# Patient Record
Sex: MALE | Race: WHITE
[De-identification: names, ages, dates, MRNs, and addresses within clinical notes are randomized per-mention and may not be internally consistent; named-entity substitution may affect disease eponyms.]

---

## 2017-12-07 ENCOUNTER — HOSPITAL ENCOUNTER (OUTPATIENT)
Dept: HOSPITAL 31 - C.ENDO | Age: 47
Discharge: HOME | End: 2017-12-07
Attending: INTERNAL MEDICINE
Payer: COMMERCIAL

## 2017-12-07 VITALS — SYSTOLIC BLOOD PRESSURE: 117 MMHG | DIASTOLIC BLOOD PRESSURE: 70 MMHG | RESPIRATION RATE: 12 BRPM | HEART RATE: 88 BPM

## 2017-12-07 VITALS — BODY MASS INDEX: 26.5 KG/M2

## 2017-12-07 VITALS — TEMPERATURE: 97.7 F

## 2017-12-07 VITALS — OXYGEN SATURATION: 99 %

## 2017-12-07 DIAGNOSIS — K21.0: Primary | ICD-10-CM

## 2017-12-07 DIAGNOSIS — K29.70: ICD-10-CM

## 2017-12-07 PROCEDURE — 43239 EGD BIOPSY SINGLE/MULTIPLE: CPT

## 2017-12-07 PROCEDURE — 88312 SPECIAL STAINS GROUP 1: CPT

## 2017-12-07 PROCEDURE — 88342 IMHCHEM/IMCYTCHM 1ST ANTB: CPT

## 2017-12-07 PROCEDURE — 88305 TISSUE EXAM BY PATHOLOGIST: CPT

## 2017-12-07 NOTE — CP.SDSHP
[facility]





- History


Proposed Procedure: endoscopy


Pre-Op Diagnosis: reflux, esophagitis





- Previous Medical/Surgical History


Comments: varicose veins





- Allergies


Allergies: 


Allergies





No Known Allergies Allergy (Verified 12/07/17 08:10)


 











- Physical Exam


Vital Signs: 


 Vital Signs











  12/07/17





  07:49


 


Temperature 97.6 F


 


Pulse Rate 66


 


Respiratory 19





Rate 


 


Blood Pressure 130/76


 


O2 Sat by Pulse 100





Oximetry 











Mental Status: Alert & Oriented x3


Neuro: WNL


Heart: WNL


Lungs: WNL


GI: WNL





- {Optional Preform as Required}


Abdomen: WNL





- Impression


Impression: reflux, esophagitis


Pt. Evaluated Today:Candidate for Anesthesia & Procedure: Yes





- Date & Time


Date: 12/07/17


Time: 09:05





Short Stay Discharge





- Short Stay Discharge


Admitting Diagnosis/Reason for Visit: GASTRO-ESOPHAGEAL REFLUX DISEASE WITHOUT 

ESOPHAGIT


Disposition: HOME/ ROUTINE

## 2018-01-05 ENCOUNTER — HOSPITAL ENCOUNTER (EMERGENCY)
Dept: HOSPITAL 42 - ED | Age: 48
Discharge: HOME | End: 2018-01-05
Payer: COMMERCIAL

## 2018-01-05 VITALS
TEMPERATURE: 98.5 F | OXYGEN SATURATION: 99 % | RESPIRATION RATE: 16 BRPM | DIASTOLIC BLOOD PRESSURE: 80 MMHG | HEART RATE: 68 BPM | SYSTOLIC BLOOD PRESSURE: 130 MMHG

## 2018-01-05 VITALS — BODY MASS INDEX: 34 KG/M2

## 2018-01-05 DIAGNOSIS — N39.0: Primary | ICD-10-CM

## 2018-01-05 LAB
APPEARANCE UR: (no result)
BACTERIA #/AREA URNS HPF: (no result) /[HPF]
BILIRUB UR-MCNC: NEGATIVE MG/DL
COLOR UR: YELLOW
EPI CELLS #/AREA URNS HPF: (no result) /HPF (ref 0–5)
GLUCOSE UR STRIP-MCNC: NEGATIVE MG/DL
LEUKOCYTE ESTERASE UR-ACNC: (no result) LEU/UL
PH UR STRIP: 6 [PH] (ref 4.7–8)
PROT UR STRIP-MCNC: (no result) MG/DL
RBC # UR STRIP: (no result) /UL
SP GR UR STRIP: >= 1.03 (ref 1–1.03)
URINE NITRATE: NEGATIVE
UROBILINOGEN UR STRIP-ACNC: 0.2 E.U./DL

## 2018-01-05 NOTE — ED PDOC
Arrival/HPI





- General


Chief Complaint: Male Genitourinary


Time Seen by Provider: 01/05/18 19:37


Historian: Patient





- History of Present Illness


Narrative History of Present Illness (Text): 





01/05/18 20:53


47-year-old male presents today with dysuria that started today. Patient states 

on [place] she had an episode of dysuria but it resolved. Patient denies 

urinary frequency. Denies fevers or chills. Denies chest pain or shortness of 

breath. Patient denies dizziness or weakness. Denies abdominal pain. No nausea 

or vomiting. Patient denies penile discharge. The patient states today he 

started to get burning again at the end of his stream. He denies hematuria. No 

other complaints


Quality: Burning


Severity Level: 1





Past Medical History





- Provider Review


Nursing Documentation Reviewed: Yes





- Travel History


Have you recently traveled outside [place] w/in the past 3 mons?: No





- Infectious Disease


Hx of Infectious Diseases: None





- Cardiac


Hx Cardiac Disorders: Yes


Hx Angina: No


Hx Cardiac Arrhythmia: No


Hx Circulatory Problems: Yes (varicous veins)


Hx Congestive Heart Failure: No


Hx MI: No


Hx Heart Murmur: No


Hx Heart Transplant: No


Hx Hypertension: No


Hx Hypotension: No


Hx Internal Defibrillator: No


Hx Mitral Valve Prolapse: No


Hx Pacemaker: No


Hx Peripheral Edema: No


Hx Peripheral Vascular Disease: No





- Pulmonary


Hx Respiratory Disorders: No





- Neurological


Hx Neurological Disorder: No





- HEENT


Hx HEENT Disorder: No





- Renal


Hx Renal Disorder: No





- Endocrine/Metabolic


Hx Endocrine Disorders: No





- Hematological/Oncological


Hx Blood Disorders: No





- Integumentary


Hx Dermatological Disorder: No





- Musculoskeletal/Rheumatological


Hx Musculoskeletal Disorders: No





- Gastrointestinal


Hx Gastrointestinal Disorders: Yes


Hx Gastroesophageal Reflux: Yes





- Genitourinary/Gynecological


Hx Genitourinary Disorders: No





- Psychiatric


Hx Psychophysiologic Disorder: No


Hx Substance Use: No





- Surgical History


Other/Comment: HAIR AND DENTAL IMPLANTS





- Anesthesia


Hx Anesthesia: Yes (Local for hair implant/DENTAL )


Hx Anesthesia Reactions: No


Hx Malignant Hyperthermia: No





Family/Social History





- Physician Review


Nursing Documentation Reviewed: Yes


Family/Social History: Unknown Family HX


Smoking Status: Never Smoked


Hx Alcohol Use: No


Hx Substance Use: No





Allergies/Home Meds


Allergies/Adverse Reactions: 


Allergies





No Known Allergies Allergy (Verified 12/07/17 08:10)


 








Home Medications: 


 Home Meds











 Medication  Instructions  Recorded  Confirmed


 


Aspirin 81 mg PO DAILY 12/07/17 01/05/18














Review of Systems





- Review of Systems


Constitutional: absent: Fatigue, Fevers


Respiratory: absent: SOB, Cough


Cardiovascular: absent: Chest Pain, Palpitations


Gastrointestinal: absent: Abdominal Pain, Nausea, Vomiting


Genitourinary Male: Dysuria, Other (no testicular pain. no penile discharge).  

absent: Frequency, Hematuria, Urinary Output Changes


Musculoskeletal: absent: Arthralgias, Back Pain, Neck Pain


Skin: absent: Rash, Pruritis


Neurological: absent: Headache, Dizziness


Psychiatric: absent: Anxiety, Depression





Physical Exam


Vital Signs Reviewed: Yes


Vital Signs











  Temp Pulse Resp BP Pulse Ox


 


 01/05/18 19:25  98.5 F  68  16  130/80  99











Temperature: Afebrile


Blood Pressure: Normal


Pulse: Regular


Respiratory Rate: Normal


Appearance: Positive for: Well-Appearing, Non-Toxic, Comfortable


Pain Distress: None


Mental Status: Positive for: Alert and Oriented X 3





- Systems Exam


Head: Present: Atraumatic


Mouth: Present: Moist Mucous Membranes


Neck: Present: Normal Range of Motion


Respiratory/Chest: Present: Clear to Auscultation, Good Air Exchange.  No: 

Respiratory Distress, Accessory Muscle Use


Cardiovascular: Present: Regular Rate and Rhythm, Normal S1, S2.  No: Murmurs


Abdomen: No: Tenderness, Distention, Rebound, Guarding


Back: Present: Normal Inspection


Neurological: Present: GCS=15


Skin: Present: Warm, Dry, Normal Color.  No: Rashes


Psychiatric: Present: Alert, Oriented x 3





Medical Decision Making


ED Course and Treatment: 





01/05/18 21:01


Patient is nontoxic well-appearing in no distress with stable vital signs





keflex po





gc/chlamydia cultures pending





Urinalysis: + luekocytes, + bacteria, 10-15 wbcs. 





Urine culture: pending





advised follow up with the primary care physician and urologist within the next 

2 days. advised immediate return if symptoms worsen,persist or if new symptoms 

develop. 





Patient verbalizes understanding of discharge instructions and need for 

immediate followup.





all aspects of this case were discussed the attending of record. 





Impression: Urinary tract infection


Motrin every 6 hours as needed for pain


keflex; 1 capsule 4 times daily x 7 days.


Followup with primary care physician within the next 2 days


Follow up with the urologist for the next 2 days


Return if symptoms worsen persist or if new symptoms develop











- Lab Interpretations


Lab Results: 


 Lab Results





01/05/18 19:59: Urine Color Yellow, Urine Appearance Sl cloudy, Urine pH 6.0, 

Ur Specific Gravity >= 1.030, Urine Protein Trace H, Urine Glucose (UA) Negative

, Urine Ketones Negative, Urine Blood Moderate H, Urine Nitrate Negative, Urine 

Bilirubin Negative, Urine Urobilinogen 0.2, Ur Leukocyte Esterase Small H, 

Urine RBC 2 - 5, Urine WBC 10 - 15, Ur Epithelial Cells 0 - 2, Urine Bacteria 

Mod











- Medication Orders


Current Medication Orders: 











Discontinued Medications





Cephalexin Monohydrate (Keflex)  500 mg PO STAT STA


   PRN Reason: Protocol


   Stop: 01/05/18 20:33


   Last Admin: 01/05/18 20:52  Dose: 500 mg











Disposition/Present on Arrival





- Present on Arrival


Any Indicators Present on Arrival: No


History of DVT/PE: No


History of Uncontrolled Diabetes: No


Urinary Catheter: No


History of Decub. Ulcer: No


History Surgical Site Infection Following: None





- Disposition


Have Diagnosis and Disposition been Completed?: Yes


Diagnosis: 


 Urinary tract infection





Disposition: HOME/ ROUTINE


Disposition Time: 20:49


Patient Plan: Discharge


Condition: GOOD


Discharge Instructions (ExitCare):  Urinary Tract Infection in Men (ED)


Additional Instructions: 


Motrin every 6 hours as needed for pain


keflex; 1 capsule 4 times daily x 7 days.


Followup with primary care physician within the next 2 days


Follow up with the urologist for the next 2 days


Return if symptoms worsen persist or if new symptoms develop


Prescriptions: 


Cephalexin [Keflex] 500 mg PO QID #28 capsule


Referrals: 


Kavon Mora MD [Primary Care Provider] - Follow up with primary


Santi Hogan MD [Staff Provider] - Follow up with primary


Forms:  [employer] (English)

## 2019-02-05 ENCOUNTER — HOSPITAL ENCOUNTER (EMERGENCY)
Dept: HOSPITAL 14 - H.ER | Age: 49
Discharge: HOME | End: 2019-02-05
Payer: COMMERCIAL

## 2019-02-05 VITALS — TEMPERATURE: 98.2 F | HEART RATE: 88 BPM | RESPIRATION RATE: 18 BRPM | OXYGEN SATURATION: 98 %

## 2019-02-05 VITALS — BODY MASS INDEX: 34 KG/M2

## 2019-02-05 VITALS — SYSTOLIC BLOOD PRESSURE: 145 MMHG | DIASTOLIC BLOOD PRESSURE: 85 MMHG

## 2019-02-05 DIAGNOSIS — I10: ICD-10-CM

## 2019-02-05 DIAGNOSIS — S83.512A: Primary | ICD-10-CM
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
## 2019-02-05 NOTE — ED PDOC
Lower Extremity Pain/Injury


Time Seen by Provider: 02/05/19 07:18


Chief Complaint (Nursing): Lower Extremity Problem/Injury


Chief Complaint (Provider): Left Knee Pain


History Per: Patient


History/Exam Limitations: no limitations


Onset/Duration Of Symptoms: Days (1)


Current Symptoms Are (Timing): Still Present


Additional Complaint(s): 


48 year old male with PMHx of HTN presents to the ED for an evaluation of left 

knee injury onset yesterday at home. Patient states he was walking down the 

stairs at home and he fell, twisting his left knee. He reports of pain and swell

ing to the area that is still present. Patient is able to ambulate with pain and

he took Ibuprofen yesterday. Otherwise, he denies any other in other injuries, 

fever, chills, shortness of breath, rash, weakness or numbness. 





PMD: No family provider 








- Knee


Description Of Injury: Fell, Twisted





Past Medical History


Reviewed: Historical Data, Nursing Documentation, Vital Signs


Vital Signs: 





                                Last Vital Signs











Temp  98.2 F   02/05/19 07:22


 


Pulse  88   02/05/19 07:22


 


Resp  18   02/05/19 07:22


 


BP  160/92 H  02/05/19 07:22


 


Pulse Ox  98   02/05/19 07:22














- Medical History


PMH: HTN


   Denies: Atrial Fibrillation, Cardia Arrhythmia, CHF, Hypercholesterolemia, 

Mitral Valve Prolapse, Peripheral Edema, Chronic Kidney Disease





- Surgical History


Surgical History: 


   Denies: Pacemaker





- Family History


Family History: States: Unknown Family Hx





- Social History


Current smoker - smoking cessation education provided: No


Alcohol: None


Drugs: Denies





- Immunization History


Hx Tetanus Toxoid Vaccination: Yes


Hx Influenza Vaccination: Yes


Hx Pneumococcal Vaccination: No





- Home Medications


Home Medications: 


                                Ambulatory Orders











 Medication  Instructions  Recorded


 


RX: Aspirin 81 mg PO DAILY 12/07/17


 


Nitrofurantoin Macrocrystals 100 mg PO BID #14 cap 01/08/18





[Macrobid]  


 


RX: Naproxen 500 mg PO BID #30 tab 02/05/19














- Allergies


Allergies/Adverse Reactions: 


                                    Allergies











Allergy/AdvReac Type Severity Reaction Status Date / Time


 


No Known Allergies Allergy   Verified 02/05/19 07:22














Review of Systems


ROS Statement: Except As Marked, All Systems Reviewed And Found Negative


Constitutional: Negative for: Fever, Chills


Respiratory: Negative for: Shortness of Breath


Musculoskeletal: Positive for: Other (left knee pain)


Skin: Negative for: Rash


Neurological: Negative for: Weakness, Numbness





Physical Exam





- Reviewed


Nursing Documentation Reviewed: Yes


Vital Signs Reviewed: Yes





- Physical Exam


Appears: Positive for: Well, Non-toxic, No Acute Distress


Head Exam: Positive for: ATRAUMATIC, NORMAL INSPECTION, NORMOCEPHALIC


Skin: Positive for: Normal Color, Warm, Dry.  Negative for: Rash


Eye Exam: Positive for: EOMI, Normal appearance, PERRL


Extremity: Positive for: Tenderness (medial aspect of left knee), Swelling, 

Other (left knee is stable).  Negative for: Normal ROM (limited ROM secondary to

 pain)


Neurologic/Psych: Positive for: Alert, Oriented (x3)





- ECG


O2 Sat by Pulse Oximetry: 98 (RA)


Pulse Ox Interpretation: Normal





Medical Decision Making


Medical Decision Making: 


Time: 0731


Impression: left knee pain and injury r/o fracture or dislocation and also 

consider ligament injury  


Plan:


--Knee 3 views LT [RAD]


--Reevaluation 





Time: 0941


--Knee w/o contrast Left [MRI]





Time: 1129


FINDINGS:


ANTERIOR CRUCIATE LIGAMENT::


Prominent edema surrounds the ligament which nevertheless does not appear to 

distorted linear trajectory and therefore is felt to represent an ACL sprain or 

partial tear. 





POSTERIOR CRUCIATE LIGAMENT::


Intact. 





MEDIAL MENISCUS::


Trace degenerative intrameniscal signal change, grade 1.  No articular tear 

identified. 





LATERAL MENISCUS::


Trace degenerative intrameniscal signal change, grade 1. No articular tear iden

tified.  Nevertheless, underlying nonvisualized tear is possible given presence 

of a small meniscal cyst laterally. 





MEDIAL COLLATERAL LIGAMENT::


Full-thickness tear anterior and mid fibers of the medial collateral ligament 

complex. 





LATERAL COLLATERAL LIGAMENT COMPLEX::


High-grade sprain versus partial tear anterior fibers lateral collateral 

ligament complex. 





QUADRICEPS TENDON::


Intact. 





PATELLAR TENDON::


Intact. 





CARTILAGE::


Mild-to-moderate chondromalacia patellofemoral articulation.  Moderate to severe

 chondromalacia medial lateral femorotibial compartments. 





JOINT FLUID::


Trace medial and lateral femorotibial compartment joint effusions.  Moderate 

suprapatellar bursa effusion including plica. 





OSSEOUS STRUCTURES::


Bone contusion mid to lateral portion lateral femoral condyle with associated 

osteochondral defect. 





OTHER FINDINGS:


None.





IMPRESSION:


1. Lateral femoral tibial contusion and associated osteochondral defect. 





2. Full-thickness tear anterior to mid fibers MCL. 





3. High-grade sprain or partial tear anterior fibers LCL. 





4. Small meniscal cyst identified associated with lateral body of lateral 

meniscus with no articular tear identified though nonvisualized tear can be 

difficult to exclude.





1135 Discussed with Dr Choudhury who recommends knee immobilizer and follow up 

with him on the office. 








------------------------------------------------------------

-------------------------------------


Scribe Attestation:   


Documented by Apple Jimenez, acting as a scribe for Last Whitley MD.





Provider Scribe Attestation:


All medical record entries made by the Scribe were at my direction and 

personally dictated by me. I have reviewed the chart and agree that the record 

accurately reflects my personal performance of the history, physical exam, 

medical decision making, and the department course for this patient. I have also

 personally directed, reviewed, and agree with the discharge instructions and 

disposition.











Disposition





- Clinical Impression


Clinical Impression: 


 Knee LCL sprain, Knee MCL sprain








- Patient ED Disposition


Is Patient to be Admitted: No


Doctor Will See Patient In The: Office


Counseled Patient/Family Regarding: Studies Performed, Diagnosis, Need For 

Followup





- Disposition


Referrals: 


Jessica Choudhury MD [Staff Provider] - 


Disposition: Routine/Home


Disposition Time: 13:01


Condition: GOOD


Additional Instructions: 


Follow up with Dr Choudhury tomorrow. Follow up with Employee office tomorrow. 





MARIALUISA CÁRDENAS, thank you for letting us take care of you today. Your provider was 

Last Whitley MD and you were treated for WC;LT LEG INJURY. The emergency 

medical care you received today was directed at your acute symptoms. If you were

prescribed any medication, please fill it and take as directed. It may take 

several days for your symptoms to resolve. Return to the Emergency Department if

your symptoms worsen, do not improve, or if you have any other problems.





Please contact your doctor or call one of the physicians/clinics you have been 

referred to that are listed on the Patient Visit Information form that is 

included in your discharge packet. Bring any paperwork you were given at 

discharge with you along with any medications you are taking to your follow up 

visit. Our treatment cannot replace ongoing medical care by a primary care 

provider outside of the emergency department.





Thank you for allowing the [facility] team to be part of your care today.








If you had an X-Ray or CT scan: A Radiologist will review the ED reading if any 

change in treatment is needed we will contact you.***





If you had a blood, urine, or wound culture: It will take several days for the 

results, if any change in treatment is needed we will contact you.***





If you had an STI test: It will take 48 hours for the results. Please call after

1 week if you have not heard back.***


Prescriptions: 


RX: Naproxen 500 mg PO BID #30 tab


Instructions:  Knee Immobilizer (DC), Ligament Injuries in the Knee


Forms:  [facility] ED School/Work Excuse

## 2019-02-05 NOTE — RAD
Date of service: 



02/05/2019



PROCEDURE:  Left Knee Radiographs.



HISTORY:

Pain.



COMPARISON:

None.



FINDINGS:



BONES:

No acute fracture or destructive bony lesion identified.



JOINTS:

No subluxation or dislocation identified. 



JOINT EFFUSION:

None. 



OTHER FINDINGS:

None.



IMPRESSION:

No acute fracture, dislocation or subluxation appreciated.  Moderate 

suprapatellar bursa effusion suggested.

## 2019-02-05 NOTE — MRI
Date of service: 



02/05/2019



PROCEDURE:  MRI Left Knee



HISTORY:

Pain. 



COMPARISON:

None available. 



TECHNIQUE:

Multiecho multiplanar sequences were performed through the left knee.



FINDINGS:



ANTERIOR CRUCIATE LIGAMENT::

Prominent edema surrounds the ligament which nevertheless does not 

appear to distorted linear trajectory and therefore is felt to 

represent an ACL sprain or partial tear. 



POSTERIOR CRUCIATE LIGAMENT::

Intact. 



MEDIAL MENISCUS::

Trace degenerative intrameniscal signal change, grade 1.  No 

articular tear identified. 



LATERAL MENISCUS::

Trace degenerative intrameniscal signal change, grade 1. No articular 

tear identified.  Nevertheless, underlying nonvisualized tear is 

possible given presence of a small meniscal cyst laterally. 



MEDIAL COLLATERAL LIGAMENT::

Full-thickness tear anterior and mid fibers of the medial collateral 

ligament complex. 



LATERAL COLLATERAL LIGAMENT COMPLEX::

High-grade sprain versus partial tear anterior fibers lateral 

collateral ligament complex. 



QUADRICEPS TENDON::

Intact. 



PATELLAR TENDON::

Intact. 



CARTILAGE::

Mild-to-moderate chondromalacia patellofemoral articulation.  

Moderate to severe chondromalacia medial lateral femorotibial 

compartments. 



JOINT FLUID::

Trace medial and lateral femorotibial compartment joint effusions.  

Moderate suprapatellar bursa effusion including plica. 



OSSEOUS STRUCTURES::

Bone contusion mid to lateral portion lateral femoral condyle with 

associated osteochondral defect. 



OTHER FINDINGS:

None.



IMPRESSION:

1. Lateral femoral tibial contusion and associated osteochondral 

defect. 



2. Full-thickness tear anterior to mid fibers MCL. 



3. High-grade sprain or partial tear anterior fibers LCL. 



4. Small meniscal cyst identified associated with lateral body of 

lateral meniscus with no articular tear identified though 

nonvisualized tear can be difficult to exclude.